# Patient Record
Sex: FEMALE | Race: WHITE
[De-identification: names, ages, dates, MRNs, and addresses within clinical notes are randomized per-mention and may not be internally consistent; named-entity substitution may affect disease eponyms.]

---

## 2019-01-26 ENCOUNTER — HOSPITAL ENCOUNTER (OUTPATIENT)
Dept: HOSPITAL 95 - LAB EV | Age: 59
Discharge: HOME | End: 2019-01-26
Attending: GENERAL PRACTICE
Payer: COMMERCIAL

## 2019-01-26 DIAGNOSIS — N39.0: Primary | ICD-10-CM

## 2023-04-26 ENCOUNTER — HOSPITAL ENCOUNTER (OUTPATIENT)
Dept: HOSPITAL 95 - MOI MAM | Age: 63
Discharge: HOME | End: 2023-04-26
Payer: COMMERCIAL

## 2023-04-26 DIAGNOSIS — D05.91: Primary | ICD-10-CM

## 2023-04-26 PROCEDURE — A4648 IMPLANTABLE TISSUE MARKER: HCPCS

## 2023-05-17 ENCOUNTER — HOSPITAL ENCOUNTER (OUTPATIENT)
Dept: HOSPITAL 95 - MOI MAM | Age: 63
LOS: 6 days | Discharge: HOME | End: 2023-05-23
Attending: STUDENT IN AN ORGANIZED HEALTH CARE EDUCATION/TRAINING PROGRAM
Payer: COMMERCIAL

## 2023-05-17 DIAGNOSIS — D05.11: Primary | ICD-10-CM

## 2023-05-26 ENCOUNTER — HOSPITAL ENCOUNTER (OUTPATIENT)
Dept: HOSPITAL 95 - ORSCMMR | Age: 63
Discharge: HOME | End: 2023-05-26
Attending: STUDENT IN AN ORGANIZED HEALTH CARE EDUCATION/TRAINING PROGRAM
Payer: COMMERCIAL

## 2023-05-26 VITALS — DIASTOLIC BLOOD PRESSURE: 81 MMHG | SYSTOLIC BLOOD PRESSURE: 146 MMHG

## 2023-05-26 VITALS — HEIGHT: 63 IN | BODY MASS INDEX: 20.94 KG/M2 | WEIGHT: 118.17 LBS

## 2023-05-26 VITALS — SYSTOLIC BLOOD PRESSURE: 128 MMHG | DIASTOLIC BLOOD PRESSURE: 60 MMHG

## 2023-05-26 VITALS — SYSTOLIC BLOOD PRESSURE: 113 MMHG | DIASTOLIC BLOOD PRESSURE: 51 MMHG

## 2023-05-26 VITALS — DIASTOLIC BLOOD PRESSURE: 87 MMHG | SYSTOLIC BLOOD PRESSURE: 120 MMHG

## 2023-05-26 VITALS — DIASTOLIC BLOOD PRESSURE: 56 MMHG | SYSTOLIC BLOOD PRESSURE: 124 MMHG

## 2023-05-26 VITALS — DIASTOLIC BLOOD PRESSURE: 51 MMHG | SYSTOLIC BLOOD PRESSURE: 113 MMHG

## 2023-05-26 VITALS — DIASTOLIC BLOOD PRESSURE: 68 MMHG | SYSTOLIC BLOOD PRESSURE: 134 MMHG

## 2023-05-26 DIAGNOSIS — D36.0: ICD-10-CM

## 2023-05-26 DIAGNOSIS — Z87.891: ICD-10-CM

## 2023-05-26 DIAGNOSIS — Z17.0: ICD-10-CM

## 2023-05-26 DIAGNOSIS — D05.11: Primary | ICD-10-CM

## 2023-05-26 PROCEDURE — A9520 TC99 TILMANOCEPT DIAG 0.5MCI: HCPCS

## 2023-05-26 PROCEDURE — 0HBT0ZZ EXCISION OF RIGHT BREAST, OPEN APPROACH: ICD-10-PCS | Performed by: STUDENT IN AN ORGANIZED HEALTH CARE EDUCATION/TRAINING PROGRAM

## 2023-05-26 PROCEDURE — 07B50ZX EXCISION OF RIGHT AXILLARY LYMPHATIC, OPEN APPROACH, DIAGNOSTIC: ICD-10-PCS | Performed by: STUDENT IN AN ORGANIZED HEALTH CARE EDUCATION/TRAINING PROGRAM

## 2023-05-26 NOTE — NUR
Ambulatory in Day Surgery
History, Chart, Medications and Allergies reviewed before start of
procedure. Lungs clear T/O to Auscultation.
Patient confirms NPO status and agrees with scheduled surgery.
Pre-Op teaching done. Pt verbalizes understanding.
Patient States Post-Procedure ride home has been arranged. PT BELONGINGS
PLACED UNDERNEATH GURNEY FOR SAFEKEEPING. PT GLASSES TAKEN TO PACU FOR
SAFEKEEPING.

## 2023-05-26 NOTE — NUR
05/26/23 1245 Becky Brennan
CALL FROM RADIOLOGY, DR. TOPETE READ SPECIMEN. DR. CHRISTIANSON NOTIFIED
INTRAOPERATIVELY

## 2023-05-26 NOTE — NUR
DISCHARGE SUMMARY
PT A&OX4, VSS/RA, VELMA PO H20, VOIDED, AMB SBA TO BRP/WC, IV DC'D, DENIED PAIN,
DRESSED SELF.  DC INS PROVIDED. PT REP UNDERSTANDING THOSE INSTRUCTIONS
INCLUDING WEARING BINDER TILL TOMORROW, OK TO SHOWER TOMORROW, INCISION
INSTRUCTIONS, FU APPT, PAIN MANAGEMENT, WHEN TO CALL THE DR. LEFT FLOOR VIA WC
WITH RN TO GO HOME WITH .